# Patient Record
(demographics unavailable — no encounter records)

---

## 2025-01-14 NOTE — ASSESSMENT
[Patient Optimized for Surgery] : Patient optimized for surgery [No Further Testing Recommended] : no further testing recommended [As per surgery] : as per surgery [FreeTextEntry4] : Patient is a moderate candidate undergoing low risk procedure.  No absolute contraindication therefore medically optimized and cleared for procedure  EKG grossly unchanged from previous.  Cardio evaluation in 2023-unremarkable

## 2025-01-14 NOTE — HISTORY OF PRESENT ILLNESS
[No Pertinent Cardiac History] : no history of aortic stenosis, atrial fibrillation, coronary artery disease, recent myocardial infarction, or implantable device/pacemaker [No Pertinent Pulmonary History] : no history of asthma, COPD, sleep apnea, or smoking [(Patient denies any chest pain, claudication, dyspnea on exertion, orthopnea, palpitations or syncope)] : Patient denies any chest pain, claudication, dyspnea on exertion, orthopnea, palpitations or syncope [FreeTextEntry1] : face lift [FreeTextEntry2] : 1/13/2025 [FreeTextEntry4] : 53 year old female with history of hypertension, hyperlipidemia, iron deficiency anemia presents for pre-operative clearance.  Overall doing well.  No acute issues

## 2025-05-28 NOTE — ASSESSMENT
[Vaccines Reviewed] : Immunizations reviewed today. Please see immunization details in the vaccine log within the immunization flowsheet.  [FreeTextEntry1] : //  Iron deficiency anemia, related to heavy menses.  Has been compliant with iron tablets -Continue iron tabs as directed -Repeat labs  HTN on HCTZ, improved -Continue HCTZ, risks and benefits of medication discussed in detail -repeat labs  Hyperlipidemia  -Continue zetia as directed  -Advised decrease greasy, fatty foods, increase exercise, fiber intake  -Elevated cholesterol has been linked to increase in cardiovascular even such as heart attack, stroke, peripheral artery disease and death  Healthcare Maintenance -Advise Yearly Skin cancer screening with Dermatologist  -Advise Yearly Eye exam with Ophthalmologist -Advise Yearly Dental exam -Educated of the importance of Healthy diet, such as Mediterranean Diet and Exercise, such as walking >20 minutes a day and increasing gradually as tolerated  Immunizations -Flu vaccine  -Covid vaccine  -Discussed shingles vaccine (shingrix), advised to verify with insurance if its covered through medical or prescription plan  Preventative screening  -advised to get PAP for cervical cancer screening -up to date  -advised to get mammogram for breast cancer screening s/p double mastectomy, gets US breast with GYN  -advised to get colonoscopy for colon cancer screening -up to date

## 2025-05-28 NOTE — HEALTH RISK ASSESSMENT
[Good] : ~his/her~  mood as  good [No] : In the past 12 months have you used drugs other than those required for medical reasons? No [No falls in past year] : Patient reported no falls in the past year [0] : 2) Feeling down, depressed, or hopeless: Not at all (0) [PHQ-2 Negative - No further assessment needed] : PHQ-2 Negative - No further assessment needed [Never] : Never [] :  [Fully functional (bathing, dressing, toileting, transferring, walking, feeding)] : Fully functional (bathing, dressing, toileting, transferring, walking, feeding) [Fully functional (using the telephone, shopping, preparing meals, housekeeping, doing laundry, using] : Fully functional and needs no help or supervision to perform IADLs (using the telephone, shopping, preparing meals, housekeeping, doing laundry, using transportation, managing medications and managing finances) [[id number]] : 0 [Change in mental status noted] : No change in mental status noted [Reports changes in hearing] : Reports no changes in hearing [Reports changes in vision] : Reports no changes in vision [ColonoscopyDate] : 2021

## 2025-05-28 NOTE — HEALTH RISK ASSESSMENT
[Good] : ~his/her~  mood as  good [No] : In the past 12 months have you used drugs other than those required for medical reasons? No [No falls in past year] : Patient reported no falls in the past year [0] : 2) Feeling down, depressed, or hopeless: Not at all (0) [PHQ-2 Negative - No further assessment needed] : PHQ-2 Negative - No further assessment needed [Never] : Never [] :  [Fully functional (bathing, dressing, toileting, transferring, walking, feeding)] : Fully functional (bathing, dressing, toileting, transferring, walking, feeding) [Fully functional (using the telephone, shopping, preparing meals, housekeeping, doing laundry, using] : Fully functional and needs no help or supervision to perform IADLs (using the telephone, shopping, preparing meals, housekeeping, doing laundry, using transportation, managing medications and managing finances) [[id number]] : 0 [Change in mental status noted] : No change in mental status noted [Reports changes in hearing] : Reports no changes in hearing [Reports changes in vision] : Reports no changes in vision [ColonoscopyDate] : 2021

## 2025-05-28 NOTE — HISTORY OF PRESENT ILLNESS
[de-identified] : 54 year old female with h/o Iron def anemia, Hypertension, hyperlipidemia presents for annual exam   no acute issues h/o iron deficiency anemia.  H&H at that time-11.1/38.2.  Since has been compliant with iron tablets.  Likely related to heavy menses.   Abnormal EKG, LBBB was seen on previous study.  no CP or SOBOE, recently seen by cardiologist.  Echocardiogram-unremarkable.  BP better on hydrochlorothiazide.   , 2 children Employed Non-smoker

## 2025-05-28 NOTE — HISTORY OF PRESENT ILLNESS
[de-identified] : 54 year old female with h/o Iron def anemia, Hypertension, hyperlipidemia presents for annual exam   no acute issues h/o iron deficiency anemia.  H&H at that time-11.1/38.2.  Since has been compliant with iron tablets.  Likely related to heavy menses.   Abnormal EKG, LBBB was seen on previous study.  no CP or SOBOE, recently seen by cardiologist.  Echocardiogram-unremarkable.  BP better on hydrochlorothiazide.   , 2 children Employed Non-smoker